# Patient Record
Sex: FEMALE | Race: OTHER | NOT HISPANIC OR LATINO | URBAN - METROPOLITAN AREA
[De-identification: names, ages, dates, MRNs, and addresses within clinical notes are randomized per-mention and may not be internally consistent; named-entity substitution may affect disease eponyms.]

---

## 2019-08-09 ENCOUNTER — EMERGENCY (EMERGENCY)
Facility: HOSPITAL | Age: 54
LOS: 1 days | Discharge: HOME | End: 2019-08-09
Attending: EMERGENCY MEDICINE
Payer: COMMERCIAL

## 2019-08-09 VITALS
OXYGEN SATURATION: 94 % | RESPIRATION RATE: 16 BRPM | TEMPERATURE: 98 F | HEIGHT: 60 IN | HEART RATE: 87 BPM | SYSTOLIC BLOOD PRESSURE: 151 MMHG | DIASTOLIC BLOOD PRESSURE: 87 MMHG | WEIGHT: 190.04 LBS

## 2019-08-09 LAB
ALBUMIN SERPL ELPH-MCNC: 4 G/DL — SIGNIFICANT CHANGE UP (ref 3.5–5)
ALP SERPL-CCNC: 75 U/L — SIGNIFICANT CHANGE UP (ref 40–120)
ALT FLD-CCNC: 43 U/L DA — SIGNIFICANT CHANGE UP (ref 10–60)
ANION GAP SERPL CALC-SCNC: 7 MMOL/L — SIGNIFICANT CHANGE UP (ref 5–17)
AST SERPL-CCNC: 22 U/L — SIGNIFICANT CHANGE UP (ref 10–40)
BILIRUB SERPL-MCNC: 0.5 MG/DL — SIGNIFICANT CHANGE UP (ref 0.2–1.2)
BUN SERPL-MCNC: 12 MG/DL — SIGNIFICANT CHANGE UP (ref 7–18)
CALCIUM SERPL-MCNC: 9.2 MG/DL — SIGNIFICANT CHANGE UP (ref 8.4–10.5)
CHLORIDE SERPL-SCNC: 103 MMOL/L — SIGNIFICANT CHANGE UP (ref 96–108)
CO2 SERPL-SCNC: 29 MMOL/L — SIGNIFICANT CHANGE UP (ref 22–31)
CREAT SERPL-MCNC: 0.98 MG/DL — SIGNIFICANT CHANGE UP (ref 0.5–1.3)
GLUCOSE SERPL-MCNC: 220 MG/DL — HIGH (ref 70–99)
HCT VFR BLD CALC: 39.4 % — SIGNIFICANT CHANGE UP (ref 34.5–45)
HGB BLD-MCNC: 12.3 G/DL — SIGNIFICANT CHANGE UP (ref 11.5–15.5)
LIDOCAIN IGE QN: 149 U/L — SIGNIFICANT CHANGE UP (ref 73–393)
MCHC RBC-ENTMCNC: 25.2 PG — LOW (ref 27–34)
MCHC RBC-ENTMCNC: 31.2 GM/DL — LOW (ref 32–36)
MCV RBC AUTO: 80.6 FL — SIGNIFICANT CHANGE UP (ref 80–100)
NRBC # BLD: 0 /100 WBCS — SIGNIFICANT CHANGE UP (ref 0–0)
PLATELET # BLD AUTO: 226 K/UL — SIGNIFICANT CHANGE UP (ref 150–400)
POTASSIUM SERPL-MCNC: 3.8 MMOL/L — SIGNIFICANT CHANGE UP (ref 3.5–5.3)
POTASSIUM SERPL-SCNC: 3.8 MMOL/L — SIGNIFICANT CHANGE UP (ref 3.5–5.3)
PROT SERPL-MCNC: 8.4 G/DL — HIGH (ref 6–8.3)
RBC # BLD: 4.89 M/UL — SIGNIFICANT CHANGE UP (ref 3.8–5.2)
RBC # FLD: 15.3 % — HIGH (ref 10.3–14.5)
SODIUM SERPL-SCNC: 139 MMOL/L — SIGNIFICANT CHANGE UP (ref 135–145)
TROPONIN I SERPL-MCNC: <0.015 NG/ML — SIGNIFICANT CHANGE UP (ref 0–0.04)
WBC # BLD: 11.45 K/UL — HIGH (ref 3.8–10.5)
WBC # FLD AUTO: 11.45 K/UL — HIGH (ref 3.8–10.5)

## 2019-08-09 PROCEDURE — 99284 EMERGENCY DEPT VISIT MOD MDM: CPT

## 2019-08-09 PROCEDURE — 84484 ASSAY OF TROPONIN QUANT: CPT

## 2019-08-09 PROCEDURE — 83690 ASSAY OF LIPASE: CPT

## 2019-08-09 PROCEDURE — 85027 COMPLETE CBC AUTOMATED: CPT

## 2019-08-09 PROCEDURE — 96374 THER/PROPH/DIAG INJ IV PUSH: CPT

## 2019-08-09 PROCEDURE — 96375 TX/PRO/DX INJ NEW DRUG ADDON: CPT

## 2019-08-09 PROCEDURE — 99284 EMERGENCY DEPT VISIT MOD MDM: CPT | Mod: 25

## 2019-08-09 PROCEDURE — 36415 COLL VENOUS BLD VENIPUNCTURE: CPT

## 2019-08-09 PROCEDURE — 80053 COMPREHEN METABOLIC PANEL: CPT

## 2019-08-09 RX ORDER — OMEPRAZOLE 10 MG/1
1 CAPSULE, DELAYED RELEASE ORAL
Qty: 14 | Refills: 0
Start: 2019-08-09 | End: 2019-08-22

## 2019-08-09 RX ORDER — ONDANSETRON 8 MG/1
4 TABLET, FILM COATED ORAL ONCE
Refills: 0 | Status: COMPLETED | OUTPATIENT
Start: 2019-08-09 | End: 2019-08-09

## 2019-08-09 RX ORDER — KETOROLAC TROMETHAMINE 30 MG/ML
15 SYRINGE (ML) INJECTION ONCE
Refills: 0 | Status: DISCONTINUED | OUTPATIENT
Start: 2019-08-09 | End: 2019-08-09

## 2019-08-09 RX ORDER — SODIUM CHLORIDE 9 MG/ML
1000 INJECTION INTRAMUSCULAR; INTRAVENOUS; SUBCUTANEOUS ONCE
Refills: 0 | Status: COMPLETED | OUTPATIENT
Start: 2019-08-09 | End: 2019-08-09

## 2019-08-09 RX ORDER — FAMOTIDINE 10 MG/ML
1 INJECTION INTRAVENOUS
Qty: 14 | Refills: 0
Start: 2019-08-09 | End: 2019-08-22

## 2019-08-09 RX ORDER — LIDOCAINE 4 G/100G
10 CREAM TOPICAL ONCE
Refills: 0 | Status: COMPLETED | OUTPATIENT
Start: 2019-08-09 | End: 2019-08-09

## 2019-08-09 RX ADMIN — Medication 15 MILLIGRAM(S): at 01:51

## 2019-08-09 RX ADMIN — SODIUM CHLORIDE 1000 MILLILITER(S): 9 INJECTION INTRAMUSCULAR; INTRAVENOUS; SUBCUTANEOUS at 01:52

## 2019-08-09 RX ADMIN — Medication 30 MILLILITER(S): at 01:51

## 2019-08-09 RX ADMIN — SODIUM CHLORIDE 1000 MILLILITER(S): 9 INJECTION INTRAMUSCULAR; INTRAVENOUS; SUBCUTANEOUS at 01:51

## 2019-08-09 RX ADMIN — LIDOCAINE 10 MILLILITER(S): 4 CREAM TOPICAL at 03:59

## 2019-08-09 RX ADMIN — ONDANSETRON 4 MILLIGRAM(S): 8 TABLET, FILM COATED ORAL at 01:51

## 2019-08-09 NOTE — ED ADULT NURSE NOTE - CAS DISCH CONDITION
Med: Metformin    Dosage: 1000 mg   Quantity requested:  135  Refill 0  LOV 2/13/19  A1c 6.9 2/18/2019  CMP done 2/18/2019  Patient has upcoming labs for Micro/Urine and A1c    Preferred pharmacy: [address] [place] [address] [address] [facility] AT [address] [facility] left message for patient to return call regarding ( how patient is taking medication) Stable

## 2019-08-09 NOTE — ED PROVIDER NOTE - CLINICAL SUMMARY MEDICAL DECISION MAKING FREE TEXT BOX
55 y/o F presents to ED complaining of abdominal pain accompanied by nausea and vomiting. Will give Toradol, Maalox, EKG, labs, IV fluids, and reassess,

## 2019-08-09 NOTE — ED ADULT NURSE NOTE - CHIEF COMPLAINT
Reason For Visit  Reason For Visit:   Patient presents for follow-up . Pt seen for meds mgmt.   Chaperone: VELVET SERNA is unaccompanied.        History of Present Illness    Pt continues to struggle with anxiety and depression at times but the Sx are usually manageable with meds and the use of her coping skills. Feeling more stressed/depressed due to her daughter's homeless situation. Pt has been compliant with meds and denies significant SE issues. No episodes of agitation, psychosis, hypomania or suicidal ideation since her last Office visit. Still having Insomnia at times but usually gets a positive response with Ambien. Continues to live on her own and can manage ADL's independently. Recovering from 2 recent falls that left her with several fractures. Provided her with RX for all of her meds.      Allergies   1. No Known Drug Allergies    Physical Exam    Orientation: Oriented x3.   Memory: short term memory intact, remote memory intact and recent registration memory intact.   Attention/Concentration: the attention span was normal and normal concentrating ability.   Observed mood and affect: anxious, depressed and was appropriate.   Observed appearance/grooming: neat   The patient's psychomotor tone exhibited normal psychomotor tone   The patient's speech exhibited a normal rate, a normal rhythm, normal tone, normal articulation, fluent, coherent and spontaneous   Thought processes: The patient exhibited normal thought processes.   Associations: Evaluation of thought association showed no deficiency.   Thought Content: The patient presents no evidence of delusions, presents no evidence of hallucinations, presents no evidence of obsessions, presents no evidence of preoccupation with violent thoughts, presents no evidence of suicidal ideation, presents no evidence of suicidal intent, presents no evidence of suicidal plans, presents no evidence of homicidal ideation, presents no evidence of homicidal intention  and presents no evidence of homicidal plans.         Judgment/Insight:The patient demonstrated intact judgment and intact insight.       Assessment   1. Major depression, recurrent, chronic (F33.9)   · Assessed By: RACHAEL COBOS (Behavioral Health); Last Assessed: 25 Sep 2018   2. Insomnia, persistent (G47.00)   · Assessed By: RACHAEL COBOS (Behavioral Health); Last Assessed: 25 Sep 2018   3. ISMAEL (generalized anxiety disorder) (F41.1)   · Assessed By: RACHAEL COBOS (Behavioral Health); Last Assessed: 25 Sep 2018   4. No family history of alcoholism (Z78.9) : Family History   · Assessed By: RACHAEL COBOS (Behavioral Health); Last Assessed: 25 Sep 2018   5. Family history of anxiety disorder (Z81.8) : Family History, Mother   · Assessed By: RACHAEL COBOS (Behavioral Health); Last Assessed: 25 Sep 2018   6. No illicit drug use   · Assessed By: RACHAEL COBOS (Behavioral Health); Last Assessed: 25 Sep 2018   7. No guns in the home   · Assessed By: RACHAEL COBOS (Behavioral Health); Last Assessed: 25 Sep 2018   8. No alcohol use   · Assessed By: RACHAEL COBOS (Behavioral Health); Last Assessed: 25 Sep 2018   9. Cigarette smoker one half pack a day or less (F17.210)   · Assessed By: RACHAEL COBOS (Behavioral Health); Last Assessed: 25 Sep 2018    Plan   · Diphenoxylate-Atropine 2.5-0.025 MG Oral Tablet; TAKE 1 TABLET BID   Rx By: RACHAEL COBOS; Dispense: 30 Days ; #:60 Tablet; Refill: 5;For: ISMAEL (generalized anxiety disorder); SHIVA = N; Print Rx   · LORazepam 2 MG Oral Tablet; TAKE 1 TABLET BY MOUTH FOUR TIMES DAILY   Rx By: RACHAEL COBOS; Dispense: 30 Days ; #:120 Tablet; Refill: 5;For: ISMAEL (generalized anxiety disorder); SHIVA = N; Print Rx   · Prochlorperazine Maleate 10 MG Oral Tablet; TAKE 1 TABLET BY MOUTH TWICE  DAILY   Rx By: RACHAEL COBOS; Dispense: 30 Days ; #:60 Tablet; Refill: 5;For: ISMAEL (generalized anxiety disorder); SHIVA = N; Verified Transmission to [facility] [id number]; Last Updated By: System, [employer]; 9/25/2018 5:22:24 PM   · DiazePAM  10 MG Oral Tablet; TAKE 1 TABLET 3 TIMES DAILY AS NEEDED   Rx By: RACHAEL COBOS; Dispense: 30 Days ; #:90 Tablet; Refill: 5;For: ISMAEL (generalized anxiety disorder), Insomnia, persistent; SHIVA = N; Print Rx   · Sertraline HCl - 100 MG Oral Tablet; TAKE 2 AND 1/2 TABLETS BY MOUTH DAILY   Rx By: RACHAEL COBOS; Dispense: 15 Days ; #:75 Tablet; Refill: 5;For: Major depression, recurrent, chronic; SHIVA = N; Verified Transmission to [facility] [id number]; Last Updated By: System, [employer]; 9/25/2018 5:22:16 PM    PLAN:   Pt will CPM and RTC in 6 months for meds mgmt.   MEDICATION:   Proper usage and side effects of medications reviewed and discussed.   Alternative treatment options discussed.   Patient verbalized understanding and gave informed consent.                    Time    Total face to face time spent with patient 20 minutes.       total time of encounter was 20 minutes and 15 minutes was spent counseling.      Discussion/Summary  Pt appears reasonably stable despite ongoing concerns regarding her COPD and ongoing conflicts with her Daughter. BF remains supportive. No new problems or Sx identified. Pt still struggles with periods of Insomnia but usually responds well to PRN Ambien. She was responsive to supportive feedback during the Office visit. Pt will RTC in 6 months for her regular meds mgmt appt.      Signatures   Electronically signed by : RACHAEL COBOS M.D.; Sep 25 2018  5:27PM CST     The patient is a 54y Female complaining of abdominal pain.

## 2019-08-09 NOTE — ED PROVIDER NOTE - OBJECTIVE STATEMENT
55 y/o F with PMHx of high blood pressure, diabetes, and high cholesterol presents to ED complaining of abdominal pain accompanied by nausea and vomiting since the morning. Pt states she also has a headache, dysuria, and a little chest pain. Pt denies fever, heart burn, diarrhea, or any other complaints. Pt also denies past surgeries or history of gallstones. NKDA. 53 y/o F with PMHx of HTN, diabetes, GERD and HLD presents to ED complaining of abdominal pain accompanied by nausea and vomiting since the morning. Pt states she also has a headache which started after vomiting and a burning sensation in her throat.  Pt denies fever, diarrhea, dysuria, chest pain or any other complaints. Pt also denies past surgeries or history of gallstones. NKDA.